# Patient Record
Sex: FEMALE | Race: WHITE | NOT HISPANIC OR LATINO | URBAN - METROPOLITAN AREA
[De-identification: names, ages, dates, MRNs, and addresses within clinical notes are randomized per-mention and may not be internally consistent; named-entity substitution may affect disease eponyms.]

---

## 2017-01-11 NOTE — PATIENT DISCUSSION
Based on today's exam, diagnostic studies, and/or review of records, the determination was made for treatment today. LUCENTIS 0.5mg recommended TODAY after discussion of benefits, risks and alternatives. The injection was given and tolerated well by the patient. Post-injection instructions were reviewed and understood by the patient. Procedure was performed without complications. Instructed to call immediately if any new distortion, blurring, decreased vision or eye pain.

## 2017-01-11 NOTE — PATIENT DISCUSSION
LUCENTIS 0.5mg treatment within ONE WEEK AND AGAIN IN 4 TO 10 WEEKS depending on duration of the disease and/or patient previous response to therapy. Patient to return for re-evaluation in 3 to 6 months.

## 2017-01-11 NOTE — PROCEDURE NOTE: CLINICAL
PROCEDURE NOTE: Lucentis 0.5 mg OS. Diagnosis: Neovascular AMD with Active CNV. Anesthesia: Subconjunctival. Prep: Betadine Flush. Prior to injection, risks/benefits/alternatives discussed including infection, loss of vision, hemorrhage, cataract, glaucoma, retinal tears or detachment and patient wished to proceed. Topical anesthesia was induced with Alcaine. Additional anesthesia was achieved using drop(s) or injection checked above. A drop of Povidone-iodine 5% ophthalmic solution was instilled over the injection site and in the inferior fornix. Betadine prep was performed. A single use vial of intravitreal Lucentis 0.5mg/0.05ml was used and excess discarded. The needle was passed 3.0 mm posterior to the limbus in pseudophakic patients, and 3.5 mm posterior to the limbus in phakic patients. The remainder of the Lucentis 0.5mg in the single-use vial was then discarded in a medical waste disposal container. The eye was irrigated with sterile irrigating solution. Patient tolerated the procedure well. There were no complications. Post procedure instructions given. Injection Time 11:00AM. CF vision checked. The patient was instructed to return for re-evaluation in approximately 4-12 weeks depending on his/her condition and was told to call immediately if vision decreases and/or if his/her eye becomes red, painful, and/or light sensitive. The patient was instructed to go to the emergency room or call 911 if unable to reach the doctor within an hour or two of trying or calling. The patient was instructed to use Artificial Tears q.i.d. p.r.n for comfort.

## 2017-03-15 NOTE — PATIENT DISCUSSION
Based on today's exam, diagnostic studies and/or review of the records, the determination was made for LUCENTIS 0.5mg treatment within [address] [place] [address] 4 TO 10 WEEKS depending on duration of the disease and/or patient previous response to therapy. Patient to return for re-evaluation in 3 to 6 months.

## 2017-03-20 NOTE — PROCEDURE NOTE: CLINICAL
PROCEDURE NOTE: Lucentis 0.5 mg OS. Diagnosis: Neovascular AMD with Active CNV. Anesthesia: Subconjunctival. Prep: Betadine Flush. Prior to injection, risks/benefits/alternatives discussed including infection, loss of vision, hemorrhage, cataract, glaucoma, retinal tears or detachment and patient wished to proceed. Topical anesthesia was induced with Alcaine. Additional anesthesia was achieved using drop(s) or injection checked above. A drop of Povidone-iodine 5% ophthalmic solution was instilled over the injection site and in the inferior fornix. Betadine prep was performed. A single use vial of intravitreal Lucentis 0.5mg/0.05ml was used and excess discarded. The needle was passed 3.0 mm posterior to the limbus in pseudophakic patients, and 3.5 mm posterior to the limbus in phakic patients. The remainder of the Lucentis 0.5mg in the single-use vial was then discarded in a medical waste disposal container. The eye was irrigated with sterile irrigating solution. Patient tolerated the procedure well. There were no complications. Post procedure instructions given. Injection Time *. CF vision checked. The patient was instructed to return for re-evaluation in approximately 4-12 weeks depending on his/her condition and was told to call immediately if vision decreases and/or if his/her eye becomes red, painful, and/or light sensitive. The patient was instructed to go to the emergency room or call 911 if unable to reach the doctor within an hour or two of trying or calling. The patient was instructed to use Artificial Tears q.i.d. p.r.n for comfort. Glenn Pratt PROCEDURE NOTE: Bandage Contact Lens OS. Diagnosis: Dry Eye Syndrome. Prior to treatment, the risks/benefits/alternatives were discussed. The patient wished to proceed with procedure. A topical anesthetic, proparacaine, was administered. A bandage contact lens was fitted for treatment of ocular surface disease. Night and day contact lens (-0.00, 13.8, 8.6), fitting and placement done without complication. Expiration date:2020*. Lot #: [id number]*. Patient tolerated procedure well. There were no complications. Post procedure instructions given. The patient tolerated the procedure well and left in good condition. Patient is instructed to make sure it is removed in 24 hours, either here, at home, or by another eye doctor. Glenn Pratt

## 2017-03-20 NOTE — PROCEDURE NOTE: CLINICAL
PROCEDURE NOTE: Lucentis 0.5 mg OS. Diagnosis: Neovascular AMD with Active CNV. Anesthesia: Subconjunctival. Prep: Betadine Flush. Prior to injection, risks/benefits/alternatives discussed including infection, loss of vision, hemorrhage, cataract, glaucoma, retinal tears or detachment and patient wished to proceed. Topical anesthesia was induced with Alcaine. Additional anesthesia was achieved using drop(s) or injection checked above. A drop of Povidone-iodine 5% ophthalmic solution was instilled over the injection site and in the inferior fornix. Betadine prep was performed. A single use vial of intravitreal Lucentis 0.5mg/0.05ml was used and excess discarded. The needle was passed 3.0 mm posterior to the limbus in pseudophakic patients, and 3.5 mm posterior to the limbus in phakic patients. The remainder of the Lucentis 0.5mg in the single-use vial was then discarded in a medical waste disposal container. The eye was irrigated with sterile irrigating solution. Patient tolerated the procedure well. There were no complications. Post procedure instructions given. Injection Time *. CF vision checked. The patient was instructed to return for re-evaluation in approximately 4-12 weeks depending on his/her condition and was told to call immediately if vision decreases and/or if his/her eye becomes red, painful, and/or light sensitive. The patient was instructed to go to the emergency room or call 911 if unable to reach the doctor within an hour or two of trying or calling. The patient was instructed to use Artificial Tears q.i.d. p.r.n for comfort. Glenn Pratt PROCEDURE NOTE: Bandage Contact Lens OS. Diagnosis: Dry Eye Syndrome. Prior to treatment, the risks/benefits/alternatives were discussed. The patient wished to proceed with procedure. A topical anesthetic, proparacaine, was administered. A bandage contact lens was fitted for treatment of ocular surface disease. Night and day contact lens (-0.00, 13.8, 8.6), fitting and placement done without complication. Expiration date:2020*. Lot #: [id number]*. Patient tolerated procedure well. There were no complications. Post procedure instructions given. The patient tolerated the procedure well and left in good condition. Patient is instructed to make sure it is removed in 24 hours, either here, at home, or by another eye doctor. Glenn Pratt

## 2017-03-20 NOTE — PATIENT DISCUSSION
Based on today's exam, diagnostic studies and/or review of the records, the determination was made for LUCENTIS 0.5mg treatment within [address] [place] [address] 4 TO 10 WEEKS depending on duration of the disease and/or patient previous response to therapy. Patient to return for re-evaluation in 3 to 6 months.

## 2017-04-19 NOTE — PROCEDURE NOTE: CLINICAL
PROCEDURE NOTE: Lucentis 0.5 mg OS. Diagnosis: Neovascular AMD with Active CNV. Anesthesia: Topical/Subconjunctival. Prep: Betadine Flush. Prior to injection, risks/benefits/alternatives discussed including infection, loss of vision, hemorrhage, cataract, glaucoma, retinal tears or detachment and patient wished to proceed. Topical anesthesia was induced with Alcaine. Additional anesthesia was achieved using drop(s) or injection checked above. A drop of Povidone-iodine 5% ophthalmic solution was instilled over the injection site and in the inferior fornix. Betadine prep was performed. A single use vial of intravitreal Lucentis 0.5mg/0.05ml was used and excess discarded. The needle was passed 3.0 mm posterior to the limbus in pseudophakic patients, and 3.5 mm posterior to the limbus in phakic patients. The remainder of the Lucentis 0.5mg in the single-use vial was then discarded in a medical waste disposal container. The eye was irrigated with sterile irrigating solution. Patient tolerated the procedure well. There were no complications. Post procedure instructions given. Injection Time 2:47PM. CF vision checked. The patient was instructed to return for re-evaluation in approximately 4-12 weeks depending on his/her condition and was told to call immediately if vision decreases and/or if his/her eye becomes red, painful, and/or light sensitive. The patient was instructed to go to the emergency room or call 911 if unable to reach the doctor within an hour or two of trying or calling. The patient was instructed to use Artificial Tears q.i.d. p.r.n for comfort. Foster Mendoza PROCEDURE NOTE: Bandage Contact Lens OS. Diagnosis: Dry Eye Syndrome. Prior to treatment, the risks/benefits/alternatives were discussed. The patient wished to proceed with procedure. A topical anesthetic, proparacaine, was administered. A bandage contact lens was fitted for treatment of ocular surface disease. Night and day contact lens (-0.00, 13.8, 8.6), fitting and placement done without complication. Expiration date: *. Lot #: *. Patient tolerated procedure well. There were no complications. Post procedure instructions given. The patient tolerated the procedure well and left in good condition. Patient is instructed to make sure it is removed in 24 hours, either here, at home, or by another eye doctor. Foster Mendoza

## 2017-05-31 NOTE — PROCEDURE NOTE: CLINICAL
PROCEDURE NOTE: Lucentis 0.5 mg OS. Diagnosis: Neovascular AMD with Active CNV. Anesthesia: Subconjunctival. Prep: Betadine Flush. Prior to injection, risks/benefits/alternatives discussed including infection, loss of vision, hemorrhage, cataract, glaucoma, retinal tears or detachment and patient wished to proceed. Topical anesthesia was induced with Alcaine. Additional anesthesia was achieved using drop(s) or injection checked above. A drop of Povidone-iodine 5% ophthalmic solution was instilled over the injection site and in the inferior fornix. Betadine prep was performed. A single use vial of intravitreal Lucentis 0.5mg/0.05ml was used and excess discarded. The needle was passed 3.0 mm posterior to the limbus in pseudophakic patients, and 3.5 mm posterior to the limbus in phakic patients. The remainder of the Lucentis 0.5mg in the single-use vial was then discarded in a medical waste disposal container. The eye was irrigated with sterile irrigating solution. Patient tolerated the procedure well. There were no complications. Post procedure instructions given. Injection Time *. CF vision checked. The patient was instructed to return for re-evaluation in approximately 4-12 weeks depending on his/her condition and was told to call immediately if vision decreases and/or if his/her eye becomes red, painful, and/or light sensitive. The patient was instructed to go to the emergency room or call 911 if unable to reach the doctor within an hour or two of trying or calling. The patient was instructed to use Artificial Tears q.i.d. p.r.n for comfort. Tanvir Reyes

## 2017-07-11 NOTE — PROCEDURE NOTE: CLINICAL

## 2017-10-03 NOTE — PROCEDURE NOTE: CLINICAL
PROCEDURE NOTE: Lucentis 0.5mg PFS OS. Diagnosis: Neovascular AMD with Active CNV. Anesthesia: Subconjunctival. Prep: Betadine Flush. Prior to injection, risks/benefits/alternatives discussed including but not limited to infection, loss of vision or eye, hemorrhage, cataract, glaucoma, retinal tears or detachment. The patient wished to proceed with treatment. Topical anesthesia was induced with Alcaine. Additional anesthesia was achieved using drop(s) or injection checked above. A drop of Povidone-iodine 5% ophthalmic solution was instilled over the injection site and in the inferior fornix. Betadine prep was performed. A single use prefilled syringe of intravitreal Lucentis 0.5mg/0.05ml was used and excess discarded. The needle was passed 3.0 mm posterior to the limbus in pseudophakic patients, and 3.5 mm posterior to the limbus in phakic patients. The remainder of the Lucentis 0.5mg in the single-use vial was then discarded in a medical waste disposal container. The eye was irrigated with sterile irrigating solution. Patient tolerated the procedure well. There were no complications. Post procedure instructions given. CF vision checked. Injection Time 2:25. The patient was instructed to return for re-evaluation in approximately 4-12 weeks depending on his/her condition and was told to call immediately if vision decreases and/or if his/her eye becomes red, painful, and/or light sensitive. The patient was instructed to go to the emergency room or call 911 if unable to reach the doctor within an hour or two of trying or calling. Omid Gutierrez

## 2019-10-13 NOTE — PATIENT DISCUSSION
"                        Renal Progress Note    Date of Admission:  10/3/2019  2:24 AM    Length of Stay: 10  Days    Subjective: no new complaints    Objective:    Current Facility-Administered Medications   Medication    0.9%  NaCl infusion    0.9%  NaCl infusion    0.9%  NaCl infusion    acetaminophen tablet 650 mg    albuterol-ipratropium 2.5 mg-0.5 mg/3 mL nebulizer solution 3 mL    amLODIPine tablet 10 mg    benzonatate capsule 200 mg    bumetanide injection 2 mg    cloNIDine tablet 0.1 mg    dextromethorphan-guaifenesin  mg/5 ml liquid 5 mL    dextrose 50% injection 12.5 g    dextrose 50% injection 25 g    docusate sodium capsule 100 mg    enoxaparin injection 30 mg    epoetin cortes-epbx injection 10,000 Units    ferrous sulfate EC tablet 325 mg    glucagon (human recombinant) injection 1 mg    glucose chewable tablet 16 g    glucose chewable tablet 24 g    heparin (porcine) injection 5,000 Units    hydrALAZINE injection 10 mg    hydrALAZINE tablet 100 mg    insulin aspart protamine-insulin aspart (NovoLOG 70/30) injection    insulin aspart U-100 pen 1-10 Units    metoprolol succinate (TOPROL-XL) 24 hr tablet 50 mg    mupirocin 2 % ointment    ondansetron injection 4 mg    polyethylene glycol packet 17 g    pravastatin tablet 10 mg    senna-docusate 8.6-50 mg per tablet 1 tablet    sodium chloride 0.9% flush 10 mL       Vitals:    10/13/19 0701 10/13/19 1124 10/13/19 1158 10/13/19 1210   BP: 136/77  137/84    BP Location: Left arm  Left arm    Patient Position: Lying  Sitting    Pulse: 64  61    Resp: 18  18    Temp: 97.7 °F (36.5 °C)  97.5 °F (36.4 °C)    TempSrc: Oral  Oral    SpO2: 97%  96% 96%   Weight:  114 kg (251 lb 5.2 oz)     Height:  5' 9" (1.753 m)         I/O last 3 completed shifts:  In: 1280 [P.O.:780; Other:500]  Out: 3033 [Urine:530; Other:2503]  I/O this shift:  In: 480 [P.O.:480]  Out: 300 [Urine:300]      Physical Exam:    NAD  + facial edema  Neck: " ARTIFICIAL TEARS to affected eye(s) as needed. supple  Heart: RRR   Lungs: Unlabored breathing  Abdomen: n/a  : n/a  Extremities:  ++ edema  Neurologic: Awake and more alert      Laboratories:    No results for input(s): WBC, RBC, HGB, HCT, PLT, MCV, MCH, MCHC in the last 24 hours.    Recent Labs   Lab 10/13/19  0357   CALCIUM 8.3*      K 3.8   CO2 22*      BUN 31*   CREATININE 2.9*       No results for input(s): COLORU, CLARITYU, SPECGRAV, PHUR, PROTEINUA, GLUCOSEU, BLOODU, WBCU, RBCU, BACTERIA, MUCUS in the last 24 hours.    Invalid input(s):  BILIRUBINCON    Microbiology Results (last 7 days)     ** No results found for the last 168 hours. **            Diagnostic Tests: n/a      Assessment:       69 y/o female pte. Of  mine in clinic admitted with:     - s/p Hypertensive crisis due to non-compliance with Meds at home (pte. 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 Anemia   - Fluid overload 2nd. CHF exacerbation? Pte.'s last weight in clinic (7/2/19 was 110 Kg) + 7 Kg. now        Plan:     - Fluid management with Dialysis (resume in a.m.)  - BP control  - Glycemic control per admitting  - Epogen to 3 x week at a higher dose  - Renal ADA diet w/ fluid restriction  - Arrange out-pte HD

## 2022-05-11 ENCOUNTER — APPOINTMENT (RX ONLY)
Dept: URBAN - METROPOLITAN AREA CLINIC 328 | Facility: CLINIC | Age: 57
Setting detail: DERMATOLOGY
End: 2022-05-11

## 2022-05-11 DIAGNOSIS — L82.1 OTHER SEBORRHEIC KERATOSIS: ICD-10-CM

## 2022-05-11 DIAGNOSIS — Z85.828 PERSONAL HISTORY OF OTHER MALIGNANT NEOPLASM OF SKIN: ICD-10-CM

## 2022-05-11 DIAGNOSIS — L90.5 SCAR CONDITIONS AND FIBROSIS OF SKIN: ICD-10-CM

## 2022-05-11 DIAGNOSIS — D18.0 HEMANGIOMA: ICD-10-CM

## 2022-05-11 DIAGNOSIS — D22 MELANOCYTIC NEVI: ICD-10-CM

## 2022-05-11 DIAGNOSIS — L81.4 OTHER MELANIN HYPERPIGMENTATION: ICD-10-CM

## 2022-05-11 PROBLEM — D22.5 MELANOCYTIC NEVI OF TRUNK: Status: ACTIVE | Noted: 2022-05-11

## 2022-05-11 PROBLEM — D18.01 HEMANGIOMA OF SKIN AND SUBCUTANEOUS TISSUE: Status: ACTIVE | Noted: 2022-05-11

## 2022-05-11 PROCEDURE — ? COUNSELING

## 2022-05-11 PROCEDURE — ? TREATMENT REGIMEN

## 2022-05-11 PROCEDURE — ? FULL BODY SKIN EXAM

## 2022-05-11 PROCEDURE — 99213 OFFICE O/P EST LOW 20 MIN: CPT

## 2022-05-11 PROCEDURE — ? ADDITIONAL NOTES

## 2022-05-11 ASSESSMENT — LOCATION SIMPLE DESCRIPTION DERM
LOCATION SIMPLE: RIGHT LOWER BACK
LOCATION SIMPLE: ABDOMEN
LOCATION SIMPLE: LEFT SHOULDER
LOCATION SIMPLE: RIGHT PRETIBIAL REGION
LOCATION SIMPLE: RIGHT PRETIBIAL REGION
LOCATION SIMPLE: LEFT UPPER BACK

## 2022-05-11 ASSESSMENT — LOCATION DETAILED DESCRIPTION DERM
LOCATION DETAILED: RIGHT DISTAL PRETIBIAL REGION
LOCATION DETAILED: LEFT MID-UPPER BACK
LOCATION DETAILED: EPIGASTRIC SKIN
LOCATION DETAILED: RIGHT DISTAL PRETIBIAL REGION
LOCATION DETAILED: RIGHT SUPERIOR MEDIAL MIDBACK
LOCATION DETAILED: LEFT ANTERIOR SHOULDER

## 2022-05-11 ASSESSMENT — LOCATION ZONE DERM
LOCATION ZONE: LEG
LOCATION ZONE: ARM
LOCATION ZONE: TRUNK
LOCATION ZONE: LEG

## 2022-05-11 NOTE — HPI: SKIN LESION
What Type Of Note Output Would You Prefer (Optional)?: Bullet Format
Has Your Skin Lesion Been Treated?: been treated
Is This A New Presentation, Or A Follow-Up?: Skin Lesion
Additional History: Treated, path reassured

## 2022-05-11 NOTE — PROCEDURE: ADDITIONAL NOTES
Render Risk Assessment In Note?: no
Additional Notes: Patent consent was obtained to proceed with the visit and recommended plan of care after discussion of all risks and benefits, including the risk of COVID 19 exposure
Detail Level: Simple

## 2022-05-18 ENCOUNTER — EMERGENCY VISIT (OUTPATIENT)
Dept: URBAN - METROPOLITAN AREA CLINIC 32 | Facility: CLINIC | Age: 57
End: 2022-05-18

## 2022-05-18 DIAGNOSIS — H02.886: ICD-10-CM

## 2022-05-18 PROCEDURE — 99202 OFFICE O/P NEW SF 15 MIN: CPT

## 2022-05-18 RX ORDER — LOTEPREDNOL ETABONATE 5 MG/G
1/2 OINTMENT OPHTHALMIC ONCE A DAY
Start: 2022-05-18

## 2022-05-18 ASSESSMENT — VISUAL ACUITY
OD_CC: 20/25+2
OS_CC: 20/25-1
OD_CC: J1+
OS_CC: J1-1

## 2022-05-18 ASSESSMENT — TONOMETRY
OS_IOP_MMHG: 14
OD_IOP_MMHG: 14

## 2022-05-18 NOTE — PATIENT DISCUSSION
No significant hordeolum today identified. Given Lotemax ointment sample to use on lower lid OS at bedtime for one week may use on OD if needed. Recommend warm soaks to lids with mild massage. pt currently taking fishoil supplements. Consider Restasis if no better.

## 2022-10-17 ENCOUNTER — APPOINTMENT (RX ONLY)
Dept: URBAN - METROPOLITAN AREA CLINIC 328 | Facility: CLINIC | Age: 57
Setting detail: DERMATOLOGY
End: 2022-10-17

## 2022-10-17 DIAGNOSIS — L82.0 INFLAMED SEBORRHEIC KERATOSIS: ICD-10-CM

## 2022-10-17 PROCEDURE — 17110 DESTRUCTION B9 LES UP TO 14: CPT

## 2022-10-17 PROCEDURE — ? LIQUID NITROGEN

## 2022-10-17 PROCEDURE — ? COUNSELING

## 2022-10-17 ASSESSMENT — LOCATION DETAILED DESCRIPTION DERM: LOCATION DETAILED: LEFT DISTAL POSTERIOR UPPER ARM

## 2022-10-17 ASSESSMENT — LOCATION ZONE DERM: LOCATION ZONE: ARM

## 2022-10-17 ASSESSMENT — LOCATION SIMPLE DESCRIPTION DERM: LOCATION SIMPLE: LEFT POSTERIOR UPPER ARM

## 2022-10-17 NOTE — PROCEDURE: LIQUID NITROGEN
Show Applicator Variable?: Yes
Spray Paint Text: The liquid nitrogen was applied to the skin utilizing a spray paint frosting technique.
Medical Necessity Information: It is in your best interest to select a reason for this procedure from the list below. All of these items fulfill various CMS LCD requirements except the new and changing color options.
Add 52 Modifier (Optional): no
Consent: The patient's consent was obtained including but not limited to risks of crusting, scabbing, blistering, scarring, darker or lighter pigmentary change, recurrence, incomplete removal and infection.
Medical Necessity Clause: This procedure was medically necessary because the lesions that were treated were:
Detail Level: Detailed
Post-Care Instructions: I reviewed with the patient in detail post-care instructions. Patient is to wear sunprotection, and avoid picking at any of the treated lesions. Pt may apply Vaseline to crusted or scabbing areas.